# Patient Record
Sex: FEMALE | Race: WHITE | NOT HISPANIC OR LATINO | Employment: STUDENT | URBAN - METROPOLITAN AREA
[De-identification: names, ages, dates, MRNs, and addresses within clinical notes are randomized per-mention and may not be internally consistent; named-entity substitution may affect disease eponyms.]

---

## 2017-12-19 ENCOUNTER — TELEPHONE (OUTPATIENT)
Dept: GASTROENTEROLOGY | Facility: CLINIC | Age: 22
End: 2017-12-19

## 2017-12-19 NOTE — TELEPHONE ENCOUNTER
Left message for patient to return call to office to cancel appointment and refer her to [facility].

## 2018-06-08 ENCOUNTER — OFFICE VISIT (OUTPATIENT)
Dept: URGENT CARE | Facility: CLINIC | Age: 23
End: 2018-06-08
Payer: COMMERCIAL

## 2018-06-08 VITALS
OXYGEN SATURATION: 99 % | DIASTOLIC BLOOD PRESSURE: 70 MMHG | HEIGHT: 61 IN | BODY MASS INDEX: 22.09 KG/M2 | TEMPERATURE: 98 F | RESPIRATION RATE: 18 BRPM | HEART RATE: 77 BPM | WEIGHT: 117 LBS | SYSTOLIC BLOOD PRESSURE: 117 MMHG

## 2018-06-08 DIAGNOSIS — R10.9 ABDOMINAL PAIN, UNSPECIFIED ABDOMINAL LOCATION: Primary | ICD-10-CM

## 2018-06-08 DIAGNOSIS — R53.83 FATIGUE, UNSPECIFIED TYPE: ICD-10-CM

## 2018-06-08 DIAGNOSIS — B34.9 ACUTE VIRAL SYNDROME: ICD-10-CM

## 2018-06-08 DIAGNOSIS — N30.00 ACUTE CYSTITIS WITHOUT HEMATURIA: ICD-10-CM

## 2018-06-08 LAB
B-HCG UR QL: NEGATIVE
BILIRUB UR QL STRIP: NEGATIVE
CTP QC/QA: YES
CTP QC/QA: YES
GLUCOSE UR QL STRIP: NEGATIVE
HETEROPH AB SER QL: NEGATIVE
KETONES UR QL STRIP: NEGATIVE
LEUKOCYTE ESTERASE UR QL STRIP: POSITIVE
PH, POC UA: 6 (ref 5–8)
POC BLOOD, URINE: NEGATIVE
POC NITRATES, URINE: NEGATIVE
PROT UR QL STRIP: NEGATIVE
SP GR UR STRIP: 1.01 (ref 1–1.03)
UROBILINOGEN UR STRIP-ACNC: NORMAL (ref 0.1–1.1)

## 2018-06-08 PROCEDURE — 3008F BODY MASS INDEX DOCD: CPT | Mod: CPTII,S$GLB,, | Performed by: NURSE PRACTITIONER

## 2018-06-08 PROCEDURE — 81003 URINALYSIS AUTO W/O SCOPE: CPT | Mod: QW,S$GLB,, | Performed by: NURSE PRACTITIONER

## 2018-06-08 PROCEDURE — 99203 OFFICE O/P NEW LOW 30 MIN: CPT | Mod: 25,S$GLB,, | Performed by: NURSE PRACTITIONER

## 2018-06-08 PROCEDURE — 86308 HETEROPHILE ANTIBODY SCREEN: CPT | Mod: QW,S$GLB,, | Performed by: NURSE PRACTITIONER

## 2018-06-08 PROCEDURE — 81025 URINE PREGNANCY TEST: CPT | Mod: S$GLB,,, | Performed by: NURSE PRACTITIONER

## 2018-06-08 RX ORDER — ONDANSETRON 4 MG/1
4 TABLET, ORALLY DISINTEGRATING ORAL EVERY 6 HOURS PRN
Qty: 16 TABLET | Refills: 0 | Status: SHIPPED | OUTPATIENT
Start: 2018-06-08 | End: 2023-08-15

## 2018-06-08 RX ORDER — LEVONORGESTREL AND ETHINYL ESTRADIOL 0.1-0.02MG
1 KIT ORAL DAILY
COMMUNITY

## 2018-06-08 RX ORDER — NITROFURANTOIN 25; 75 MG/1; MG/1
100 CAPSULE ORAL 2 TIMES DAILY
Qty: 10 CAPSULE | Refills: 0 | Status: SHIPPED | OUTPATIENT
Start: 2018-06-08 | End: 2018-06-13

## 2018-06-08 RX ORDER — FLUTICASONE PROPIONATE 50 MCG
1 SPRAY, SUSPENSION (ML) NASAL DAILY
COMMUNITY
End: 2023-08-15

## 2018-06-08 NOTE — PROGRESS NOTES
"Subjective:       Patient ID: Jennifer Velarde is a 22 y.o. female.    Vitals:  height is 5' 1" (1.549 m) and weight is 53.1 kg (117 lb). Her oral temperature is 97.8 °F (36.6 °C). Her blood pressure is 117/70 and her pulse is 77. Her respiration is 18 and oxygen saturation is 99%.     Chief Complaint: Abdominal Pain    Patient presents with complaint of fatigue, weakness and nausea in the last 3 days. Patient complaining of a temperature of 99.8 F. Patient denies diarrhea. LBM was this morning. No blood in stool. No vomiting. Abdominal pain subsided this morning but present for 2 hours today. Patient has had abdominal issues in the last month. Patient reports the issues as abdominal tenderness and constipation. Patient saw her NP at health services and it was suggested that she do a fleet enema. Patient did an enema at home followed by miralax at home for 7 days. Patient communicates she had relief with remedy. Patient also saw her obgyn to r/o ovarian cysts which were negative. Patient does not suspect any chance for pregnancy. She did take two at home pregnancy tests last month; both were negative. Patient without any urinary sxs: no dysuria, hematuria, urgency or frequency.       Abdominal Pain   The onset quality is undetermined. The problem occurs intermittently. The problem has been gradually improving. The pain is located in the LLQ. The pain is at a severity of 5/10 (when present). Quality: stabbing. The abdominal pain radiates to the epigastric region. Associated symptoms include constipation and nausea. Pertinent negatives include no diarrhea, dysuria, fever, hematochezia, melena or vomiting.     Review of Systems   Constitution: Positive for weakness and malaise/fatigue. Negative for chills, fever and night sweats.   Cardiovascular: Negative for chest pain.   Respiratory: Negative for shortness of breath.    Musculoskeletal: Negative for back pain.   Gastrointestinal: Positive for abdominal pain, " constipation and nausea. Negative for diarrhea, heartburn, hematemesis, hematochezia, hemorrhoids, melena and vomiting.   Genitourinary: Negative for dysuria.       Objective:      Physical Exam   Constitutional: She is oriented to person, place, and time. She appears well-developed and well-nourished.  Non-toxic appearance. She does not have a sickly appearance. She does not appear ill. No distress.   HENT:   Head: Normocephalic and atraumatic.   Right Ear: External ear normal.   Left Ear: External ear normal.   Nose: Nose normal.   Mouth/Throat: Mucous membranes are normal. Posterior oropharyngeal erythema (mild) present. No oropharyngeal exudate or posterior oropharyngeal edema.   Eyes: Conjunctivae and lids are normal.   Allergic shiners bilaterally    Neck: Trachea normal, normal range of motion, full passive range of motion without pain and phonation normal. Neck supple.   Cardiovascular: Normal rate, regular rhythm, normal heart sounds and normal pulses.    Pulmonary/Chest: Effort normal and breath sounds normal. No respiratory distress.   Abdominal: Soft. Normal appearance and bowel sounds are normal. She exhibits no distension, no fluid wave, no abdominal bruit, no ascites, no pulsatile midline mass and no mass. There is no tenderness. There is no rigidity, no rebound, no guarding, no CVA tenderness, no tenderness at McBurney's point and negative Garcia's sign.   Musculoskeletal: Normal range of motion. She exhibits no edema.   Lymphadenopathy:     She has cervical adenopathy.        Right cervical: Superficial cervical and posterior cervical adenopathy present.   Neurological: She is alert and oriented to person, place, and time. She has normal strength.   Skin: Skin is warm, dry and intact. She is not diaphoretic. No pallor.   Psychiatric: She has a normal mood and affect. Her speech is normal and behavior is normal. Judgment and thought content normal. Cognition and memory are normal.   Nursing note and  vitals reviewed.        Results for orders placed or performed in visit on 06/08/18   POCT urine pregnancy   Result Value Ref Range    POC Preg Test, Ur Negative Negative     Acceptable Yes    POCT Urinalysis, Dipstick, Automated, W/O Scope   Result Value Ref Range    POC Blood, Urine Negative Negative    POC Bilirubin, Urine Negative Negative    POC Urobilinogen, Urine Normal 0.1 - 1.1    POC Ketones, Urine Negative Negative    POC Protein, Urine Negative Negative    POC Nitrates, Urine Negative Negative    POC Glucose, Urine Negative Negative    pH, UA 6.0 5 - 8    POC Specific Gravity, Urine 1.015 1.003 - 1.029    POC Leukocytes, Urine Positive (A) Negative   POCT Infectious mononucleosis antibody   Result Value Ref Range    Monospot Negative Negative     Acceptable Yes        Assessment:       1. Abdominal pain, unspecified abdominal location    2. Fatigue, unspecified type    3. Acute viral syndrome    4. Acute cystitis without hematuria        Plan:         Abdominal pain, unspecified abdominal location  -     POCT urine pregnancy  -     POCT Urinalysis, Dipstick, Automated, W/O Scope  -     Urine culture    Fatigue, unspecified type  -     POCT Infectious mononucleosis antibody    Acute viral syndrome  -     ondansetron (ZOFRAN-ODT) 4 MG TbDL; Take 1 tablet (4 mg total) by mouth every 6 (six) hours as needed.  Dispense: 16 tablet; Refill: 0    Acute cystitis without hematuria  -     nitrofurantoin, macrocrystal-monohydrate, (MACROBID) 100 MG capsule; Take 1 capsule (100 mg total) by mouth 2 (two) times daily.  Dispense: 10 capsule; Refill: 0      Trace leukocytes in urine. She would like to wait for culture results before starting antibiotics.   Discussed merlene s.s for reevaluation and for ER.   Encouraged fluids and bland diet. She verbalized understanding.    ADDENDUM 1735: patient called back to the clinic and reports she has changed her mind and would like to start an  antibiotic for the trace leukocytes instead of waiting for the urine culture results. Discussed that this is likely a contaminated specimen. She verbalized understanding and would still like to start antibiotics.   RX for Macrobid for 5 days.       Patient Instructions     Urine culture sent today. We will call you with the results.   Rest  [place] diet   Force fluids  zofran as needed for nausea.   Follow up with PCP for persist GI issues.     ABDOMINAL PAIN          Based on your visit today, the exact cause of your abdominal (stomach) pain is not certain. Your condition does not seem serious now; however, the signs of a serious problem may take more time to appear. Therefore, it is important for you to watch for any new symptoms or worsening of your condition as we discussed in the clinic, including appendicitis, kidney stones, ruptured ovarian cysts, diverticulitis.      HOME CARE:  1. Rest until your next exam. No strenuous activities.  2. Eat a diet low in fiber (called a low-residue diet). Foods allowed include refined breads, white rice, fruit and vegetable juices without pulp, tender meats. These foods will pass more easily through the intestine.  3. Avoid whole-grain foods, whole fruits and vegetables, meats, seeds and nuts, fried or fatty foods, dairy, alcohol and spicy foods until your symptoms go away.     FOLLOW UP with your doctor or this facility as instructed, or if your pain does not begin to improve in the next 24 hours.     GET PROMPT MEDICAL ATTENTION if any of the following occur:  Pain gets worse or moves to the right lower abdomen  New or worsening vomiting or diarrhea  Swelling of the abdomen  Unable to pass stool for more than three days  New fever over 100.0º F (37.8ºC), or rising fever  Blood in vomit or bowel movements (dark red or black color)  Jaundice (yellow color of eyes and skin)  Weakness, dizziness or fainting  Chest, arm, back, neck or jaw pain  Unexpected vaginal bleeding or  "missed period        Viral Syndrome (Adult)  A viral illness may cause a number of symptoms. The symptoms depend on the part of the body that the virus affects. If it settles in your nose, throat, and lungs, it may cause cough, sore throat, congestion, and sometimes headache. If it settles in your stomach and intestinal tract, it may cause vomiting and diarrhea. Sometimes it causes vague symptoms like "aching all over," feeling tired, loss of appetite, or fever.  A viral illness usually lasts 1 to 2 weeks, but sometimes it lasts longer. In some cases, a more serious infection can look like a viral syndrome in the first few days of the illness. You may need another exam and additional tests to know the difference. Watch for the warning signs listed below.  Home care  Follow these guidelines for taking care of yourself at home:  · If symptoms are severe, rest at home for the first 2 to 3 days.  · Stay away from cigarette smoke - both your smoke and the smoke from others.  · You may use over-the-counter acetaminophen or ibuprofen for fever, muscle aching, and headache, unless another medicine was prescribed for this. If you have chronic liver or kidney disease or ever had a stomach ulcer or GI bleeding, talk with your doctor before using these medicines. No one who is younger than 18 and ill with a fever should take aspirin. It may cause severe disease or death.  · Your appetite may be poor, so a light diet is fine. Avoid dehydration by drinking 8 to 12 8-ounce glasses of fluids each day. This may include water; orange juice; lemonade; apple, grape, and cranberry juice; clear fruit drinks; electrolyte replacement and sports drinks; and decaffeinated teas and coffee. If you have been diagnosed with a kidney disease, ask your doctor how much and what types of fluids you should drink to prevent dehydration. If you have kidney disease, drinking too much fluid can cause it build up in the your body and be dangerous to your " health.  · Over-the-counter remedies won't shorten the length of the illness but may be helpful for cough, sore throat; and nasal and sinus congestion. Don't use decongestants if you have high blood pressure.  Follow-up care  Follow up with your healthcare provider if you do not improve over the next week.  Call 911  Get emergency medical care if any of the following occur:  · Convulsion  · Feeling weak, dizzy, or like you are going to faint  · Chest pain, shortness of breath, wheezing, or difficulty breathing  When to seek medical advice  Call your healthcare provider right away if any of these occur:  · Cough with lots of colored sputum (mucus) or blood in your sputum  · Chest pain, shortness of breath, wheezing, or difficulty breathing  · Severe headache; face, neck, or ear pain  · Severe, constant pain in the lower right side of your belly (abdominal)  · Continued vomiting (cant keep liquids down)  · Frequent diarrhea (more than 5 times a day); blood (red or black color) or mucus in diarrhea  · Feeling weak, dizzy, or like you are going to faint  · Extreme thirst  · Fever of 100.4°F (38°C) or higher, or as directed by your healthcare provider  Date Last Reviewed: 9/25/2015  © 6877-4443 [employer]. [address] [place] [address], [place] [address]. All rights reserved. This information is not intended as a substitute for professional medical care. Always follow your healthcare professional's instructions.        Abdominal Pain    Abdominal pain is pain in the stomach or belly area. Everyone has this pain from time to time. In many cases it goes away on its own. But abdominal pain can sometimes be due to a serious problem, such as appendicitis. So its important to know when to seek help.  Causes of abdominal pain  There are many possible causes of abdominal pain. Common causes in adults include:  · Constipation, diarrhea, or gas  · Stomach acid flowing back up into the esophagus (acid reflux or  heartburn)  · Severe acid reflux, called GERD (gastroesophageal reflux disease)  · A sore in the lining of the stomach or small intestine (peptic ulcer)  · Inflammation of the gallbladder, liver, or pancreas  · Gallstones or kidney stones  · Appendicitis   · Intestinal blockage   · An internal organ pushing through a muscle or other tissue (hernia)  · Urinary tract infections  · In women, menstrual cramps, fibroids, or endometriosis  · Inflammation or infection of the intestines  Diagnosing the cause of abdominal pain  Your healthcare provider will do a physical exam help find the cause of your pain. If needed, tests will be ordered. Belly pain has many possible causes. So it can be hard to find the reason for your pain. Giving details about your pain can help. Tell your provider where and when you feel the pain, and what makes it better or worse. Also let your provider know if you have other symptoms such as:  · Fever  · Tiredness  · Upset stomach (nausea)  · Vomiting  · Changes in bathroom habits  Treating abdominal pain  Some causes of pain need emergency medical treatment right away. These include appendicitis or a bowel blockage. Other problems can be treated with rest, fluids, or medicines. Your healthcare provider can give you specific instructions for treatment or self-care based on what is causing your pain.  If you have vomiting or diarrhea, sip water or other clear fluids. When you are ready to eat solid foods again, start with small amounts of easy-to-digest, low-fat foods. These include apple sauce, toast, or crackers.   When to seek medical care  Call 911 or go to the hospital right away if you:  · Cant pass stool and are vomiting  · Are vomiting blood or have bloody diarrhea or black, tarry diarrhea  · Have chest, neck, or shoulder pain  · Feel like you might pass out  · Have pain in your shoulder blades with nausea  · Have sudden, severe belly pain  · Have new, severe pain unlike any you have felt  before  · Have a belly that is rigid, hard, and tender to touch  Call your healthcare provider if you have:  · Pain for more than 5 days  · Bloating for more than 2 days  · Diarrhea for more than 5 days  · A fever of 100.4°F (38.0°C) or higher, or as directed by your provider  · Pain that gets worse  · Weight loss for no reason  · Continued lack of appetite  · Blood in your stool  How to prevent abdominal pain  Here are some tips to help prevent abdominal pain:  · Eat smaller amounts of food at one time.  · Avoid greasy, fried, or other high-fat foods.  · Avoid foods that give you gas.  · Exercise regularly.  · Drink plenty of fluids.  To help prevent GERD symptoms:  · Quit smoking.  · Reduce alcohol and certain foods that increase stomach acid.  · Avoid aspirin and over-the-counter pain and fever medicines (NSAIDS or nonsteroidal anti-inflammatory drugs), if possible  · Lose extra weight.  · Finish eating at least 2 hours before you go to bed or lie down.  · Raise the head of your bed.  Date Last Reviewed: 7/1/2016  © 0915-1933 [employer]. [address] [place] [address]. All rights reserved. This information is not intended as a substitute for professional medical care. Always follow your healthcare professional's instructions.

## 2018-06-08 NOTE — PATIENT INSTRUCTIONS
Urine culture sent today. We will call you with the results.   Rest  [place] diet   Force fluids  zofran as needed for nausea.   Follow up with PCP for persist GI issues.     ABDOMINAL PAIN          Based on your visit today, the exact cause of your abdominal (stomach) pain is not certain. Your condition does not seem serious now; however, the signs of a serious problem may take more time to appear. Therefore, it is important for you to watch for any new symptoms or worsening of your condition as we discussed in the clinic, including appendicitis, kidney stones, ruptured ovarian cysts, diverticulitis.      HOME CARE:  1. Rest until your next exam. No strenuous activities.  2. Eat a diet low in fiber (called a low-residue diet). Foods allowed include refined breads, white rice, fruit and vegetable juices without pulp, tender meats. These foods will pass more easily through the intestine.  3. Avoid whole-grain foods, whole fruits and vegetables, meats, seeds and nuts, fried or fatty foods, dairy, alcohol and spicy foods until your symptoms go away.     FOLLOW UP with your doctor or this facility as instructed, or if your pain does not begin to improve in the next 24 hours.     GET PROMPT MEDICAL ATTENTION if any of the following occur:  Pain gets worse or moves to the right lower abdomen  New or worsening vomiting or diarrhea  Swelling of the abdomen  Unable to pass stool for more than three days  New fever over 100.0º F (37.8ºC), or rising fever  Blood in vomit or bowel movements (dark red or black color)  Jaundice (yellow color of eyes and skin)  Weakness, dizziness or fainting  Chest, arm, back, neck or jaw pain  Unexpected vaginal bleeding or missed period        Viral Syndrome (Adult)  A viral illness may cause a number of symptoms. The symptoms depend on the part of the body that the virus affects. If it settles in your nose, throat, and lungs, it may cause cough, sore throat, congestion, and sometimes headache.  "If it settles in your stomach and intestinal tract, it may cause vomiting and diarrhea. Sometimes it causes vague symptoms like "aching all over," feeling tired, loss of appetite, or fever.  A viral illness usually lasts 1 to 2 weeks, but sometimes it lasts longer. In some cases, a more serious infection can look like a viral syndrome in the first few days of the illness. You may need another exam and additional tests to know the difference. Watch for the warning signs listed below.  Home care  Follow these guidelines for taking care of yourself at home:  · If symptoms are severe, rest at home for the first 2 to 3 days.  · Stay away from cigarette smoke - both your smoke and the smoke from others.  · You may use over-the-counter acetaminophen or ibuprofen for fever, muscle aching, and headache, unless another medicine was prescribed for this. If you have chronic liver or kidney disease or ever had a stomach ulcer or GI bleeding, talk with your doctor before using these medicines. No one who is younger than 18 and ill with a fever should take aspirin. It may cause severe disease or death.  · Your appetite may be poor, so a light diet is fine. Avoid dehydration by drinking 8 to 12 8-ounce glasses of fluids each day. This may include water; orange juice; lemonade; apple, grape, and cranberry juice; clear fruit drinks; electrolyte replacement and sports drinks; and decaffeinated teas and coffee. If you have been diagnosed with a kidney disease, ask your doctor how much and what types of fluids you should drink to prevent dehydration. If you have kidney disease, drinking too much fluid can cause it build up in the your body and be dangerous to your health.  · Over-the-counter remedies won't shorten the length of the illness but may be helpful for cough, sore throat; and nasal and sinus congestion. Don't use decongestants if you have high blood pressure.  Follow-up care  Follow up with your healthcare provider if you do " not improve over the next week.  Call 911  Get emergency medical care if any of the following occur:  · Convulsion  · Feeling weak, dizzy, or like you are going to faint  · Chest pain, shortness of breath, wheezing, or difficulty breathing  When to seek medical advice  Call your healthcare provider right away if any of these occur:  · Cough with lots of colored sputum (mucus) or blood in your sputum  · Chest pain, shortness of breath, wheezing, or difficulty breathing  · Severe headache; face, neck, or ear pain  · Severe, constant pain in the lower right side of your belly (abdominal)  · Continued vomiting (cant keep liquids down)  · Frequent diarrhea (more than 5 times a day); blood (red or black color) or mucus in diarrhea  · Feeling weak, dizzy, or like you are going to faint  · Extreme thirst  · Fever of 100.4°F (38°C) or higher, or as directed by your healthcare provider  Date Last Reviewed: 9/25/2015  © 0498-8354 [employer]. [address] [place] [address]. All rights reserved. This information is not intended as a substitute for professional medical care. Always follow your healthcare professional's instructions.        Abdominal Pain    Abdominal pain is pain in the stomach or belly area. Everyone has this pain from time to time. In many cases it goes away on its own. But abdominal pain can sometimes be due to a serious problem, such as appendicitis. So its important to know when to seek help.  Causes of abdominal pain  There are many possible causes of abdominal pain. Common causes in adults include:  · Constipation, diarrhea, or gas  · Stomach acid flowing back up into the esophagus (acid reflux or heartburn)  · Severe acid reflux, called GERD (gastroesophageal reflux disease)  · A sore in the lining of the stomach or small intestine (peptic ulcer)  · Inflammation of the gallbladder, liver, or pancreas  · Gallstones or kidney stones  · Appendicitis   · Intestinal blockage   · An  internal organ pushing through a muscle or other tissue (hernia)  · Urinary tract infections  · In women, menstrual cramps, fibroids, or endometriosis  · Inflammation or infection of the intestines  Diagnosing the cause of abdominal pain  Your healthcare provider will do a physical exam help find the cause of your pain. If needed, tests will be ordered. Belly pain has many possible causes. So it can be hard to find the reason for your pain. Giving details about your pain can help. Tell your provider where and when you feel the pain, and what makes it better or worse. Also let your provider know if you have other symptoms such as:  · Fever  · Tiredness  · Upset stomach (nausea)  · Vomiting  · Changes in bathroom habits  Treating abdominal pain  Some causes of pain need emergency medical treatment right away. These include appendicitis or a bowel blockage. Other problems can be treated with rest, fluids, or medicines. Your healthcare provider can give you specific instructions for treatment or self-care based on what is causing your pain.  If you have vomiting or diarrhea, sip water or other clear fluids. When you are ready to eat solid foods again, start with small amounts of easy-to-digest, low-fat foods. These include apple sauce, toast, or crackers.   When to seek medical care  Call 911 or go to the hospital right away if you:  · Cant pass stool and are vomiting  · Are vomiting blood or have bloody diarrhea or black, tarry diarrhea  · Have chest, neck, or shoulder pain  · Feel like you might pass out  · Have pain in your shoulder blades with nausea  · Have sudden, severe belly pain  · Have new, severe pain unlike any you have felt before  · Have a belly that is rigid, hard, and tender to touch  Call your healthcare provider if you have:  · Pain for more than 5 days  · Bloating for more than 2 days  · Diarrhea for more than 5 days  · A fever of 100.4°F (38.0°C) or higher, or as directed by your provider  · Pain  that gets worse  · Weight loss for no reason  · Continued lack of appetite  · Blood in your stool  How to prevent abdominal pain  Here are some tips to help prevent abdominal pain:  · Eat smaller amounts of food at one time.  · Avoid greasy, fried, or other high-fat foods.  · Avoid foods that give you gas.  · Exercise regularly.  · Drink plenty of fluids.  To help prevent GERD symptoms:  · Quit smoking.  · Reduce alcohol and certain foods that increase stomach acid.  · Avoid aspirin and over-the-counter pain and fever medicines (NSAIDS or nonsteroidal anti-inflammatory drugs), if possible  · Lose extra weight.  · Finish eating at least 2 hours before you go to bed or lie down.  · Raise the head of your bed.  Date Last Reviewed: 7/1/2016  © 5551-9786 [employer]. [address] [place] [address], [place] [address]. All rights reserved. This information is not intended as a substitute for professional medical care. Always follow your healthcare professional's instructions.

## 2018-06-11 ENCOUNTER — TELEPHONE (OUTPATIENT)
Dept: URGENT CARE | Facility: CLINIC | Age: 23
End: 2018-06-11

## 2018-06-11 NOTE — TELEPHONE ENCOUNTER
Patient returned call. She has improved since her visit. Patient wanting to know the result of her urine cx. Informed patient that cx is still pending. Patient without further questions at time of call. -[facility]

## 2018-06-12 ENCOUNTER — TELEPHONE (OUTPATIENT)
Dept: URGENT CARE | Facility: CLINIC | Age: 23
End: 2018-06-12

## 2018-06-12 LAB
BACTERIA UR CULT: NORMAL
BACTERIA UR CULT: NORMAL

## 2019-04-02 ENCOUNTER — OFFICE VISIT (OUTPATIENT)
Dept: OBSTETRICS AND GYNECOLOGY | Facility: CLINIC | Age: 24
End: 2019-04-02
Payer: COMMERCIAL

## 2019-04-02 ENCOUNTER — TELEPHONE (OUTPATIENT)
Dept: PHARMACY | Facility: CLINIC | Age: 24
End: 2019-04-02

## 2019-04-02 VITALS
SYSTOLIC BLOOD PRESSURE: 110 MMHG | BODY MASS INDEX: 21.39 KG/M2 | DIASTOLIC BLOOD PRESSURE: 76 MMHG | WEIGHT: 113.31 LBS | HEIGHT: 61 IN

## 2019-04-02 DIAGNOSIS — Z01.419 ENCOUNTER FOR GYNECOLOGICAL EXAMINATION WITHOUT ABNORMAL FINDING: Primary | ICD-10-CM

## 2019-04-02 DIAGNOSIS — Z30.9 ENCOUNTER FOR CONTRACEPTIVE MANAGEMENT, UNSPECIFIED TYPE: ICD-10-CM

## 2019-04-02 PROCEDURE — 88142 CYTOPATH C/V THIN LAYER: CPT | Performed by: PATHOLOGY

## 2019-04-02 PROCEDURE — 88141 CYTOPATH C/V INTERPRET: CPT | Mod: ,,, | Performed by: PATHOLOGY

## 2019-04-02 PROCEDURE — 99999 PR PBB SHADOW E&M-EST. PATIENT-LVL III: ICD-10-PCS | Mod: PBBFAC,,, | Performed by: OBSTETRICS & GYNECOLOGY

## 2019-04-02 PROCEDURE — 99999 PR PBB SHADOW E&M-EST. PATIENT-LVL III: CPT | Mod: PBBFAC,,, | Performed by: OBSTETRICS & GYNECOLOGY

## 2019-04-02 PROCEDURE — 99385 PREV VISIT NEW AGE 18-39: CPT | Mod: S$GLB,,, | Performed by: OBSTETRICS & GYNECOLOGY

## 2019-04-02 PROCEDURE — 99385 PR PREVENTIVE VISIT,NEW,18-39: ICD-10-PCS | Mod: S$GLB,,, | Performed by: OBSTETRICS & GYNECOLOGY

## 2019-04-02 PROCEDURE — 88141 LIQUID-BASED PAP SMEAR, SCREENING: ICD-10-PCS | Mod: ,,, | Performed by: PATHOLOGY

## 2019-04-02 NOTE — PROGRESS NOTES
"CC: Well woman exam    Jennifer Velarde is a 23 y.o. female No obstetric history on file. presents for a well woman exam.  She has no issues, problems, or complaints.  She is on OCPs and would like to continue.  May consider a MIRENA IUD.   Declines STD testing    Past Medical History:   Diagnosis Date    Allergy        History reviewed. No pertinent surgical history.    OB History   No data available       Family History   Problem Relation Age of Onset    Breast cancer Neg Hx     Colon cancer Neg Hx     Ovarian cancer Neg Hx        Social History     Tobacco Use    Smoking status: Never Smoker    Smokeless tobacco: Never Used   Substance Use Topics    Alcohol use: Yes    Drug use: No       /76   Ht 5' 1" (1.549 m)   Wt 51.4 kg (113 lb 5.1 oz)   LMP 03/04/2019   BMI 21.41 kg/m²     ROS:  GENERAL: Denies weight gain or weight loss. Feeling well overall.   SKIN: Denies rash or lesions.   HEAD: Denies head injury or headache.   NODES: Denies enlarged lymph nodes.   CHEST: Denies chest pain or shortness of breath.   CARDIOVASCULAR: Denies palpitations or left sided chest pain.   ABDOMEN: No abdominal pain, constipation, diarrhea, nausea, vomiting or rectal bleeding.   URINARY: No frequency, dysuria, hematuria, or burning on urination.  REPRODUCTIVE: See HPI.   BREASTS: The patient performs breast self-examination and denies pain, lumps, or nipple discharge.   HEMATOLOGIC: No easy bruisability or excessive bleeding.  MUSCULOSKELETAL: Denies joint pain or swelling.   NEUROLOGIC: Denies syncope or weakness.   PSYCHIATRIC: Denies depression, anxiety or mood swings.    Physical Exam:    APPEARANCE: Well nourished, well developed, in no acute distress.  AFFECT: WNL, alert and oriented x 3  SKIN: No acne or hirsutism  NECK: Neck symmetric without masses or thyromegaly  NODES: No inguinal, cervical, axillary, or femoral lymph node enlargement  CHEST: Good respiratory effect  ABDOMEN: Soft.  No tenderness or " masses.  No hepatosplenomegaly.  No hernias.  BREASTS: Symmetrical, no skin changes or visible lesions.  No palpable masses, nipple discharge bilaterally.  PELVIC: Normal external genitalia without lesions.  Normal hair distribution.  Adequate perineal body, normal urethral meatus.  Vagina moist and well rugated without lesions or discharge.  Cervix pink, without lesions, discharge or tenderness.  No significant cystocele or rectocele.  Bimanual exam shows uterus to be normal size, regular, mobile and nontender.  Adnexa without masses or tenderness.    EXTREMITIES: No edema.    ASSESSMENT AND PLAN  1. Encounter for gynecological examination without abnormal finding  Liquid-based pap smear, screening   2. Encounter for contraceptive management, unspecified type  levonorgestrel (MIRENA) 20 mcg/24 hr (5 years) IUD    Device Authorization Order       Patient was counseled today on A.C.S. Pap guidelines and recommendations for yearly pelvic exams, mammograms and monthly self breast exams; to see her PCP for other health maintenance.     Follow up in about 1 year (around 4/2/2020).

## 2019-04-10 ENCOUNTER — TELEPHONE (OUTPATIENT)
Dept: OBSTETRICS AND GYNECOLOGY | Facility: CLINIC | Age: 24
End: 2019-04-10

## 2019-04-11 NOTE — TELEPHONE ENCOUNTER
Patient advised of Mirena approval. Patient given contact information to inquire about copay. Patient verbalized understanding.

## 2019-04-25 NOTE — TELEPHONE ENCOUNTER
Patient returned call & declined pharmacist consultation & provided consent to schedule shipment to [facility]. She has an appointment for insertion on 5/6/19. Ok to confirm delivery with [facility]

## 2019-05-07 ENCOUNTER — PROCEDURE VISIT (OUTPATIENT)
Dept: OBSTETRICS AND GYNECOLOGY | Facility: CLINIC | Age: 24
End: 2019-05-07
Payer: COMMERCIAL

## 2019-05-07 VITALS
BODY MASS INDEX: 22.48 KG/M2 | WEIGHT: 119.06 LBS | DIASTOLIC BLOOD PRESSURE: 68 MMHG | SYSTOLIC BLOOD PRESSURE: 92 MMHG | HEIGHT: 61 IN

## 2019-05-07 DIAGNOSIS — Z30.9 ENCOUNTER FOR CONTRACEPTIVE MANAGEMENT, UNSPECIFIED TYPE: Primary | ICD-10-CM

## 2019-05-07 DIAGNOSIS — Z30.430 ENCOUNTER FOR IUD INSERTION: ICD-10-CM

## 2019-05-07 DIAGNOSIS — R87.615 ENCOUNTER FOR REPEAT PAP SMEAR DUE TO PREVIOUS INSUFF CERVICAL CELLS: ICD-10-CM

## 2019-05-07 LAB
B-HCG UR QL: NEGATIVE
CTP QC/QA: YES

## 2019-05-07 PROCEDURE — 81025 URINE PREGNANCY TEST: CPT | Mod: S$GLB,,, | Performed by: OBSTETRICS & GYNECOLOGY

## 2019-05-07 PROCEDURE — 58300 INSERTION OF IUD: ICD-10-PCS | Mod: S$GLB,,, | Performed by: OBSTETRICS & GYNECOLOGY

## 2019-05-07 PROCEDURE — 88175 CYTOPATH C/V AUTO FLUID REDO: CPT

## 2019-05-07 PROCEDURE — 81025 POCT URINE PREGNANCY: ICD-10-PCS | Mod: S$GLB,,, | Performed by: OBSTETRICS & GYNECOLOGY

## 2019-05-07 PROCEDURE — 58300 INSERT INTRAUTERINE DEVICE: CPT | Mod: S$GLB,,, | Performed by: OBSTETRICS & GYNECOLOGY

## 2019-05-07 RX ORDER — AZELASTINE HYDROCHLORIDE, FLUTICASONE PROPIONATE 137; 50 UG/1; UG/1
SPRAY, METERED NASAL
COMMUNITY

## 2019-05-07 NOTE — PROCEDURES
Insertion of IUD  Date/Time: 5/7/2019 9:11 AM  Performed by: Elenita Landon MD  Authorized by: Elenita Landon MD     Consent:     Consent obtained:  Written    Consent given by:  Patient    Procedure risks and benefits discussed: yes      Patient questions answered: yes      Patient agrees, verbalizes understanding, and wants to proceed: yes      Educational handouts given: yes      Instructions and paperwork completed: yes    Procedure:     Pelvic exam performed: yes      Negative GC/chlamydia test: no      Negative urine pregnancy test: yes      Negative serum pregnancy test: no      Cervix cleaned and prepped: yes      Speculum placed in vagina: yes      Tenaculum applied to cervix: yes      Uterus sounded: yes      Uterus sound depth (cm):  7    IUD inserted with no complications: yes      Strings trimmed: yes    Post-procedure:     Patient tolerated procedure well: yes      Patient will follow up after next period: yes

## 2019-06-11 ENCOUNTER — OFFICE VISIT (OUTPATIENT)
Dept: OBSTETRICS AND GYNECOLOGY | Facility: CLINIC | Age: 24
End: 2019-06-11
Payer: COMMERCIAL

## 2019-06-11 VITALS
HEIGHT: 61 IN | WEIGHT: 116.94 LBS | BODY MASS INDEX: 22.08 KG/M2 | DIASTOLIC BLOOD PRESSURE: 64 MMHG | SYSTOLIC BLOOD PRESSURE: 105 MMHG

## 2019-06-11 DIAGNOSIS — Z30.431 IUD CHECK UP: Primary | ICD-10-CM

## 2019-06-11 PROCEDURE — 99213 OFFICE O/P EST LOW 20 MIN: CPT | Mod: S$GLB,,, | Performed by: OBSTETRICS & GYNECOLOGY

## 2019-06-11 PROCEDURE — 99999 PR PBB SHADOW E&M-EST. PATIENT-LVL III: ICD-10-PCS | Mod: PBBFAC,,, | Performed by: OBSTETRICS & GYNECOLOGY

## 2019-06-11 PROCEDURE — 99213 PR OFFICE/OUTPT VISIT, EST, LEVL III, 20-29 MIN: ICD-10-PCS | Mod: S$GLB,,, | Performed by: OBSTETRICS & GYNECOLOGY

## 2019-06-11 PROCEDURE — 99999 PR PBB SHADOW E&M-EST. PATIENT-LVL III: CPT | Mod: PBBFAC,,, | Performed by: OBSTETRICS & GYNECOLOGY

## 2019-06-11 NOTE — PROGRESS NOTES
"CC: IUD string check    Jennifer Velarde is a 23 y.o. female No obstetric history on file. presents for an IUD check up.   String are normal and palpated.  MIRENA PLACED ON 5/7/2019.  Had vaginal spotting but has improved.    Past Medical History:   Diagnosis Date    Allergy        History reviewed. No pertinent surgical history.    OB History   No data available       Family History   Problem Relation Age of Onset    Breast cancer Neg Hx     Colon cancer Neg Hx     Ovarian cancer Neg Hx        Social History     Tobacco Use    Smoking status: Never Smoker    Smokeless tobacco: Never Used   Substance Use Topics    Alcohol use: Yes    Drug use: No       /64   Ht 5' 1" (1.549 m)   Wt 53 kg (116 lb 15.3 oz)   LMP 06/03/2019   BMI 22.10 kg/m²     ROS:  GENERAL: Denies weight gain or weight loss. Feeling well overall.   SKIN: Denies rash or lesions.   HEAD: Denies head injury or headache.   NODES: Denies enlarged lymph nodes.   CHEST: Denies chest pain or shortness of breath.   CARDIOVASCULAR: Denies palpitations or left sided chest pain.   ABDOMEN: No abdominal pain, constipation, diarrhea, nausea, vomiting or rectal bleeding.   URINARY: No frequency, dysuria, hematuria, or burning on urination.  REPRODUCTIVE: See HPI.   BREASTS: The patient performs breast self-examination and denies pain, lumps, or nipple discharge.   HEMATOLOGIC: No easy bruisability or excessive bleeding.  MUSCULOSKELETAL: Denies joint pain or swelling.   NEUROLOGIC: Denies syncope or weakness.   PSYCHIATRIC: Denies depression, anxiety or mood swings.    Physical Exam:    APPEARANCE: Well nourished, well developed, in no acute distress.  AFFECT: WNL, alert and oriented x 3  SKIN: No acne or hirsutism  NECK: Neck symmetric without masses or thyromegaly  NODES: No inguinal, cervical, axillary, or femoral lymph node enlargement  CHEST: Good respiratory effect  ABDOMEN: Soft.  No tenderness or masses.  No hepatosplenomegaly.  No " hernias.  PELVIC: Normal external genitalia without lesions.  Normal hair distribution.  Adequate perineal body, normal urethral meatus.  Vagina moist and well rugated without lesions or discharge.  Cervix pink, IUD string at the os,  without lesions, discharge or tenderness.  No significant cystocele or rectocele.  Bimanual exam shows uterus to be normal size, regular, mobile and nontender.  Adnexa without masses or tenderness.    EXTREMITIES: No edema.    ASSESSMENT AND PLAN  1. IUD check up         Patient was counseled today on A.C.S. Pap guidelines and recommendations for yearly pelvic exams, mammograms and monthly self breast exams; to see her PCP for other health maintenance.     Follow up in about 1 year (around 6/11/2020), or if symptoms worsen or fail to improve.

## 2022-01-01 ENCOUNTER — OFFICE VISIT (OUTPATIENT)
Dept: URGENT CARE | Facility: CLINIC | Age: 27
End: 2022-01-01

## 2022-01-01 VITALS
BODY MASS INDEX: 22.66 KG/M2 | TEMPERATURE: 98 F | HEIGHT: 61 IN | OXYGEN SATURATION: 98 % | HEART RATE: 62 BPM | SYSTOLIC BLOOD PRESSURE: 114 MMHG | DIASTOLIC BLOOD PRESSURE: 76 MMHG | RESPIRATION RATE: 18 BRPM | WEIGHT: 120 LBS

## 2022-01-01 DIAGNOSIS — U07.1 COVID-19: Primary | ICD-10-CM

## 2022-01-01 LAB
CTP QC/QA: YES
SARS-COV-2 RDRP RESP QL NAA+PROBE: POSITIVE

## 2022-01-01 PROCEDURE — 99203 PR OFFICE/OUTPT VISIT, NEW, LEVL III, 30-44 MIN: ICD-10-PCS | Mod: TIER,S$GLB,, | Performed by: NURSE PRACTITIONER

## 2022-01-01 PROCEDURE — U0002 COVID-19 LAB TEST NON-CDC: HCPCS | Mod: QW,S$GLB,, | Performed by: NURSE PRACTITIONER

## 2022-01-01 PROCEDURE — 99203 OFFICE O/P NEW LOW 30 MIN: CPT | Mod: TIER,S$GLB,, | Performed by: NURSE PRACTITIONER

## 2022-01-01 PROCEDURE — U0002: ICD-10-PCS | Mod: QW,S$GLB,, | Performed by: NURSE PRACTITIONER

## 2022-01-01 RX ORDER — CETIRIZINE HYDROCHLORIDE 10 MG/1
TABLET ORAL
COMMUNITY

## 2022-01-01 NOTE — PROGRESS NOTES
"Subjective:       Patient ID: Jennifer Velarde is a 26 y.o. female.    Vitals:  height is 5' 1" (1.549 m) and weight is 54.4 kg (120 lb). Her oral temperature is 98.3 °F (36.8 °C). Her blood pressure is 114/76 and her pulse is 62. Her respiration is 18 and oxygen saturation is 98%.     Chief Complaint: Sinus Problem    Sinus Problem  This is a new problem. The current episode started in the past 7 days (monday). The problem has been waxing and waning since onset. Associated symptoms include congestion, coughing, ear pain and headaches. Treatments tried: zyrtec sudafed aleve 220 ml and 500 zinc vitamin c. The treatment provided mild relief.       Constitution: Positive for fatigue.   HENT: Positive for ear pain, congestion and postnasal drip.    Respiratory: Positive for cough.    Neurological: Positive for headaches.       Objective:      Physical Exam   Constitutional: She is oriented to person, place, and time. She appears well-developed and well-nourished. She is cooperative.  Non-toxic appearance. She does not have a sickly appearance. She does not appear ill. No distress.   HENT:   Head: Normocephalic and atraumatic.   Ears:   Right Ear: Hearing, tympanic membrane, external ear and ear canal normal.   Left Ear: Hearing, tympanic membrane, external ear and ear canal normal.   NOSE/THROAT EXAM NOT PERFORMED TO DECREASE VIRAL TRANSMISSION OF COVID      Comments: NOSE/THROAT EXAM NOT PERFORMED TO DECREASE VIRAL TRANSMISSION OF COVID  Eyes: Conjunctivae and lids are normal. No scleral icterus.   Neck: Trachea normal and phonation normal. Neck supple. No edema present. No erythema present. No neck rigidity present.   Cardiovascular: Normal rate, regular rhythm, normal heart sounds, intact distal pulses and normal pulses.   Pulmonary/Chest: Effort normal and breath sounds normal. No respiratory distress. She has no decreased breath sounds. She has no rhonchi.   Abdominal: Normal appearance.   Musculoskeletal: Normal " range of motion.         General: No deformity or edema. Normal range of motion.   Neurological: She is alert and oriented to person, place, and time. She exhibits normal muscle tone. Coordination normal.   Skin: Skin is warm, dry, intact, not diaphoretic and not pale.   Psychiatric: She has a normal mood and affect. Her speech is normal and behavior is normal. Judgment and thought content normal. Cognition and memory  Nursing note and vitals reviewed.          Assessment:       1. COVID-19          Plan:         COVID-19  -     POCT COVID-19 Rapid Screening    -EUA RISK SCORE OF 0, NOT A CANDIDATE FOR EUA INFUSION     -DISCUSSED QUARANTINE GUIDELINES           COVID-19 Discharge Instructions   About this topic   Coronavirus disease 2019 is also known as COVID-19. It is a viral illness that infects the lungs. It is caused by a virus called SARS-associated coronavirus (SARS-CoV-2).  The signs of COVID-19 most often start a few days after you have been infected. In some people, it takes longer to show signs. Others never show signs of the infection. You may have a cough, fever, shaking chills and it may be hard to breathe. You may be very tired, have muscle aches, a headache or sore throat. Some people have an upset stomach or loose stools. Others lose their sense of smell or taste. You may not have these signs all the time and they may come and go while you are sick.  The virus spreads easily through droplets when you talk, sneeze, or cough. You can pass the virus to others when you are talking close together, singing, hugging, sharing food, or shaking hands. Doctors believe the germs also survive on surfaces like tables, door handles, and telephones. However, this is not a common way that COVID-19 spreads. Doctors believe you can also spread the infection even if you dont have any symptoms, but they do not know how that happens. This is why getting vaccinated is one of the best ways to keep you healthy and slow the  spread of the virus.  Some people have a mild case of COVID-19 and are able to stay at home and away from others until they feel better. Others may need to be in the hospital if they are very sick. Some people with COVID-19 can have some symptoms for weeks or months. People with COVID-19 must isolate themselves. You can start to be around others when your doctor says it is safe to do so.       What care is needed at home?   · Ask your doctor what you need to do when you go home. Make sure you ask questions if you do not understand what the doctor says.  · Drink lots of water, juice, or broth to replace fluids lost from a fever.  · You may use cool mist humidifiers to help ease congestion and coughing.  · Use 2 to 3 pillows to prop yourself up when you lie down to make it easier to breathe and sleep.  · Do not smoke and do not drink beer, wine, and mixed drinks (alcohol).  · To lower the chance of passing the infection to others, get a COVID-19 vaccine after your infection has resolved.  · If you have not been fully vaccinated:  ? Wear a mask over your mouth and nose if you are around others who are not sick. Cloth masks work best if they have more than one layer of fabric.  ? Wash your hands often.  ? Stay home in a separate room, if possible, away from others. Only go out to get medical care.  ? Use a separate bathroom if possible.  ? Do not make food for others.  What follow-up care is needed?   · Your doctor may ask you to make visits to the office to check on your progress. Be sure to keep these visits. Make sure you wear a mask at these visits.  · If you can, tell the staff you have COVID-19 ahead of time so they can take extra care to stop the disease from spreading.  · It may take a few weeks before your health returns to normal.  What drugs may be needed?   The doctor may order drugs to:  · Help with breathing  · Help with fever  · Help with swelling in your airways and lungs  · Control coughing  · Ease a  sore throat  · Help a runny or stuffy nose  Will physical activity be limited?   You may have to limit your physical activity. Talk to your doctor about the right amount of activity for you. If you have been very sick with COVID-19, it can take some time to get your strength back.  Will there be any other care needed?   Doctors do not know how long you can pass the virus on to others after you are sick. This is why it is important to stay in a separate room, if possible, when you are sick. For now, doctors are giving general guidelines for you to follow after you have been sick. Before you go around other people, you should:  · Be fever free for 24 hours without taking any drugs to lower the fever  · Have no symptoms of cough or shortness of breath  · Wait at least 10 days after first having symptoms or your first positive test, and you need to be symptom free as above. Some experts suggest waiting 20 days if you have had a more severe infection.  Talk with your doctor about getting a COVID-19 vaccine.  What problems could happen?   · Fluid loss. This is dehydration.  · Short-term or long-term lung damage  · Heart problems  · Death  When do I need to call the doctor?   · You are having so much trouble breathing that you can only say one or two words at a time.  · You need to sit upright at all times to be able to breathe and/or cannot lie down.  · You are very confused or cannot stay awake.  · Your lips or skin start to turn blue or grey.  · You think you might be having a medical emergency. Some examples of medical emergencies are:  ? Severe chest pain.  ? Not able to speak or move normally.  · You have trouble breathing when talking or sitting still.  · You have new shortness of breath.  · You become weak or dizzy.  · You have very dark urine or do not pass urine for more than 8 hours.  · You have new or worsening COVID-19 symptoms like:  ? Fever  ? Cough  ? Feeling very tired  ? Shaking  chills  ? Headache  ? Trouble swallowing  ? Throwing up  ? Loose stools  ? Reddish purple spots on your fingers or toes  Teach Back: Helping You Understand   The Teach Back Method helps you understand the information we are giving you. After you talk with the staff, tell them in your own words what you learned. This helps to make sure the staff has described each thing clearly. It also helps to explain things that may have been confusing. Before going home, make sure you can do these:  · I can tell you about my condition.  · I can tell you what may help ease my breathing.  · I can tell you what I can do to help avoid passing the infection to others.  · I can tell you what I will do if I have trouble breathing; feel sleepy or confused; or my fingertips, fingernails, skin, or lips are blue.  Where can I learn more?   Centers for Disease Control and Prevention  https://www.cdc.gov/coronavirus/2019-ncov/about/index.html   Centers for Disease Control and Prevention  https://www.cdc.gov/coronavirus/2019-ncov/hcp/disposition-in-home-patients.html   World Health Organization  https://www.who.int/news-room/q-a-detail/v-r-egcvteekzecmb   Last Reviewed Date   2021-10-05  Consumer Information Use and Disclaimer   This information is not specific medical advice and does not replace information you receive from your health care provider. This is only a brief summary of general information. It does NOT include all information about conditions, illnesses, injuries, tests, procedures, treatments, therapies, discharge instructions or life-style choices that may apply to you. You must talk with your health care provider for complete information about your health and treatment options. This information should not be used to decide whether or not to accept your health care providers advice, instructions or recommendations. Only your health care provider has the knowledge and training to provide advice that is right for you.  Copyright    Copyright © 2021 [employer] and its affiliates and/or licensors. All rights reserved.

## 2022-01-01 NOTE — PATIENT INSTRUCTIONS
Patient Education       COVID-19 Discharge Instructions   About this topic   Coronavirus disease 2019 is also known as COVID-19. It is a viral illness that infects the lungs. It is caused by a virus called SARS-associated coronavirus (SARS-CoV-2).  The signs of COVID-19 most often start a few days after you have been infected. In some people, it takes longer to show signs. Others never show signs of the infection. You may have a cough, fever, shaking chills and it may be hard to breathe. You may be very tired, have muscle aches, a headache or sore throat. Some people have an upset stomach or loose stools. Others lose their sense of smell or taste. You may not have these signs all the time and they may come and go while you are sick.  The virus spreads easily through droplets when you talk, sneeze, or cough. You can pass the virus to others when you are talking close together, singing, hugging, sharing food, or shaking hands. Doctors believe the germs also survive on surfaces like tables, door handles, and telephones. However, this is not a common way that COVID-19 spreads. Doctors believe you can also spread the infection even if you dont have any symptoms, but they do not know how that happens. This is why getting vaccinated is one of the best ways to keep you healthy and slow the spread of the virus.  Some people have a mild case of COVID-19 and are able to stay at home and away from others until they feel better. Others may need to be in the hospital if they are very sick. Some people with COVID-19 can have some symptoms for weeks or months. People with COVID-19 must isolate themselves. You can start to be around others when your doctor says it is safe to do so.       What care is needed at home?   · Ask your doctor what you need to do when you go home. Make sure you ask questions if you do not understand what the doctor says.  · Drink lots of water, juice, or broth to replace fluids lost from a fever.  · You  may use cool mist humidifiers to help ease congestion and coughing.  · Use 2 to 3 pillows to prop yourself up when you lie down to make it easier to breathe and sleep.  · Do not smoke and do not drink beer, wine, and mixed drinks (alcohol).  · To lower the chance of passing the infection to others, get a COVID-19 vaccine after your infection has resolved.  · If you have not been fully vaccinated:  ? Wear a mask over your mouth and nose if you are around others who are not sick. Cloth masks work best if they have more than one layer of fabric.  ? Wash your hands often.  ? Stay home in a separate room, if possible, away from others. Only go out to get medical care.  ? Use a separate bathroom if possible.  ? Do not make food for others.  What follow-up care is needed?   · Your doctor may ask you to make visits to the office to check on your progress. Be sure to keep these visits. Make sure you wear a mask at these visits.  · If you can, tell the staff you have COVID-19 ahead of time so they can take extra care to stop the disease from spreading.  · It may take a few weeks before your health returns to normal.  What drugs may be needed?   The doctor may order drugs to:  · Help with breathing  · Help with fever  · Help with swelling in your airways and lungs  · Control coughing  · Ease a sore throat  · Help a runny or stuffy nose  Will physical activity be limited?   You may have to limit your physical activity. Talk to your doctor about the right amount of activity for you. If you have been very sick with COVID-19, it can take some time to get your strength back.  Will there be any other care needed?   Doctors do not know how long you can pass the virus on to others after you are sick. This is why it is important to stay in a separate room, if possible, when you are sick. For now, doctors are giving general guidelines for you to follow after you have been sick. Before you go around other people, you should:  · Be fever  free for 24 hours without taking any drugs to lower the fever  · Have no symptoms of cough or shortness of breath  · Wait at least 10 days after first having symptoms or your first positive test, and you need to be symptom free as above. Some experts suggest waiting 20 days if you have had a more severe infection.  Talk with your doctor about getting a COVID-19 vaccine.  What problems could happen?   · Fluid loss. This is dehydration.  · Short-term or long-term lung damage  · Heart problems  · Death  When do I need to call the doctor?   · You are having so much trouble breathing that you can only say one or two words at a time.  · You need to sit upright at all times to be able to breathe and/or cannot lie down.  · You are very confused or cannot stay awake.  · Your lips or skin start to turn blue or grey.  · You think you might be having a medical emergency. Some examples of medical emergencies are:  ? Severe chest pain.  ? Not able to speak or move normally.  · You have trouble breathing when talking or sitting still.  · You have new shortness of breath.  · You become weak or dizzy.  · You have very dark urine or do not pass urine for more than 8 hours.  · You have new or worsening COVID-19 symptoms like:  ? Fever  ? Cough  ? Feeling very tired  ? Shaking chills  ? Headache  ? Trouble swallowing  ? Throwing up  ? Loose stools  ? Reddish purple spots on your fingers or toes  Teach Back: Helping You Understand   The Teach Back Method helps you understand the information we are giving you. After you talk with the staff, tell them in your own words what you learned. This helps to make sure the staff has described each thing clearly. It also helps to explain things that may have been confusing. Before going home, make sure you can do these:  · I can tell you about my condition.  · I can tell you what may help ease my breathing.  · I can tell you what I can do to help avoid passing the infection to others.  · I can tell  you what I will do if I have trouble breathing; feel sleepy or confused; or my fingertips, fingernails, skin, or lips are blue.  Where can I learn more?   Centers for Disease Control and Prevention  https://www.cdc.gov/coronavirus/2019-ncov/about/index.html   Centers for Disease Control and Prevention  https://www.cdc.gov/coronavirus/2019-ncov/hcp/disposition-in-home-patients.html   World Health Organization  https://www.who.int/news-room/q-a-detail/b-k-ljaabxqsinkmp   Last Reviewed Date   2021-10-05  Consumer Information Use and Disclaimer   This information is not specific medical advice and does not replace information you receive from your health care provider. This is only a brief summary of general information. It does NOT include all information about conditions, illnesses, injuries, tests, procedures, treatments, therapies, discharge instructions or life-style choices that may apply to you. You must talk with your health care provider for complete information about your health and treatment options. This information should not be used to decide whether or not to accept your health care providers advice, instructions or recommendations. Only your health care provider has the knowledge and training to provide advice that is right for you.  Copyright   Copyright © 2021 [employer] and its affiliates and/or licensors. All rights reserved.

## 2022-01-02 ENCOUNTER — PATIENT MESSAGE (OUTPATIENT)
Dept: ADMINISTRATIVE | Facility: OTHER | Age: 27
End: 2022-01-02

## 2023-07-02 ENCOUNTER — OFFICE VISIT (OUTPATIENT)
Dept: URGENT CARE | Facility: CLINIC | Age: 28
End: 2023-07-02
Payer: COMMERCIAL

## 2023-07-02 VITALS
BODY MASS INDEX: 22.66 KG/M2 | TEMPERATURE: 98 F | DIASTOLIC BLOOD PRESSURE: 73 MMHG | HEIGHT: 61 IN | SYSTOLIC BLOOD PRESSURE: 113 MMHG | HEART RATE: 76 BPM | WEIGHT: 120 LBS | RESPIRATION RATE: 18 BRPM | OXYGEN SATURATION: 99 %

## 2023-07-02 DIAGNOSIS — R05.9 COUGH, UNSPECIFIED TYPE: ICD-10-CM

## 2023-07-02 DIAGNOSIS — U07.1 COVID-19: Primary | ICD-10-CM

## 2023-07-02 LAB
CTP QC/QA: YES
SARS-COV-2 AG RESP QL IA.RAPID: POSITIVE

## 2023-07-02 PROCEDURE — 87811 SARS-COV-2 COVID19 W/OPTIC: CPT | Mod: QW,S$GLB,,

## 2023-07-02 PROCEDURE — 99203 OFFICE O/P NEW LOW 30 MIN: CPT | Mod: S$GLB,,,

## 2023-07-02 PROCEDURE — 87811 SARS CORONAVIRUS 2 ANTIGEN POCT, MANUAL READ: ICD-10-PCS | Mod: QW,S$GLB,,

## 2023-07-02 PROCEDURE — 99203 PR OFFICE/OUTPT VISIT, NEW, LEVL III, 30-44 MIN: ICD-10-PCS | Mod: S$GLB,,,

## 2023-07-02 NOTE — PROGRESS NOTES
"Subjective:      Patient ID: Jennifer Velarde is a 28 y.o. female.    Vitals:  height is 5' 1" (1.549 m) and weight is 54.4 kg (120 lb). Her temperature is 98.3 °F (36.8 °C). Her blood pressure is 113/73 and her pulse is 76. Her respiration is 18 and oxygen saturation is 99%.     Chief Complaint: Sore Throat (Congestion.  Runny nose   Fatigue - Entered by patient)    Pt complains x3 days of sore throat, sinus pressure, post nasal drip, nasal congestion, headache, cough, fatigue. Took tylenol with mild relief.     Sore Throat   This is a new problem. The current episode started yesterday. The problem has been gradually worsening. There has been no fever. The pain is at a severity of 2/10. The patient is experiencing no pain. Associated symptoms include congestion, coughing, headaches and trouble swallowing. Pertinent negatives include no abdominal pain, diarrhea, drooling, ear discharge, ear pain, hoarse voice, plugged ear sensation, neck pain, shortness of breath, stridor, swollen glands or vomiting. Exposure to: covid.     HENT:  Positive for congestion, sore throat and trouble swallowing. Negative for ear pain, ear discharge and drooling.    Neck: Negative for neck pain.   Respiratory:  Positive for cough. Negative for shortness of breath and stridor.    Gastrointestinal:  Negative for abdominal pain, vomiting and diarrhea.   Neurological:  Positive for headaches. Negative for disorientation and altered mental status.   Psychiatric/Behavioral:  Negative for altered mental status and disorientation.     Objective:     Physical Exam   Constitutional: She is oriented to person, place, and time. She appears well-developed. She is cooperative.  Non-toxic appearance. She does not appear ill. No distress.      Comments:Patient sits comfortably in exam chair. Answers questions in complete sentences. Does not show any signs of distress or discoloration.        HENT:   Head: Normocephalic and atraumatic.   Ears:   Right " Ear: Hearing, tympanic membrane, external ear and ear canal normal. impacted cerumen  Left Ear: Hearing, tympanic membrane, external ear and ear canal normal. impacted cerumen  Nose: Rhinorrhea and congestion present. No mucosal edema or nasal deformity. No epistaxis. Right sinus exhibits no maxillary sinus tenderness and no frontal sinus tenderness. Left sinus exhibits no maxillary sinus tenderness and no frontal sinus tenderness.   Mouth/Throat: Uvula is midline and mucous membranes are normal. No trismus in the jaw. Normal dentition. No uvula swelling. Posterior oropharyngeal erythema present. No oropharyngeal exudate or posterior oropharyngeal edema.   Eyes: Conjunctivae and lids are normal. No scleral icterus.   Neck: Trachea normal and phonation normal. Neck supple. No edema present. No erythema present. No neck rigidity present.   Cardiovascular: Normal rate, regular rhythm, normal heart sounds and normal pulses.   Pulmonary/Chest: Effort normal and breath sounds normal. No stridor. No respiratory distress. She has no decreased breath sounds. She has no wheezes. She has no rhonchi. She has no rales.   Abdominal: Normal appearance.   Musculoskeletal: Normal range of motion.         General: No deformity. Normal range of motion.   Lymphadenopathy:     She has no cervical adenopathy.        Right cervical: No superficial cervical, no deep cervical and no posterior cervical adenopathy present.       Left cervical: No superficial cervical, no deep cervical and no posterior cervical adenopathy present.   Neurological: She is alert and oriented to person, place, and time. She exhibits normal muscle tone. Coordination normal.   Skin: Skin is warm, dry, intact, not diaphoretic and not pale.   Psychiatric: Her speech is normal and behavior is normal. Judgment and thought content normal.   Nursing note and vitals reviewed.  Results for orders placed or performed in visit on 07/02/23   SARS Coronavirus 2 Antigen, POCT  Manual Read   Result Value Ref Range    SARS Coronavirus 2 Antigen Positive (A) Negative     Acceptable Yes        Assessment:     1. COVID-19    2. Cough, unspecified type        Plan:       COVID-19    Cough, unspecified type  -     SARS Coronavirus 2 Antigen, POCT Manual Read             Patient Instructions    You have tested positive for COVID-19 today.       ISOLATION  If you tested positive and do not have symptoms, you must isolate for 5 days starting on the day of the positive test. I     If you tested positive and have symptoms, you must isolate for 5 days starting on the day of the first symptoms,  not the day of the positive test.     This is the most important part, both the CDC and the [facility] emphasize that you do not test out of isolation.     After 5 days, if your symptoms have improved and you have not had fever on day 5, you can return to the community on day 6- NO TESTING REQUIRED!      In fact, we do not retest if you were positive in the last 90 days.     After your 5 days of isolation are completed, the CDC recommends strict mask use for the first 5 days that you come out of isolation.      CDC Testing and Quarantine Guidelines for patients with exposure to a known-positive COVID-19 person:  ·     A 'close exposure' is defined as anyone who has had an exposure (masked or unmasked) to a known COVID -19 positive person          within 6 feet of someone          for a cumulative total of 15 minutes or more over a 24-hour period.  ·     vaccinated Have been boosted or completed the primary series of Pfizer or Moderna vaccine within the last 6 months or completed the primary series of J&J vaccine within the last 2 months and/or had a positive test within 90 days          do NOT need to quarantine after contact with someone who had COVID-19 unless they have symptoms.          fully vaccinated people who have not had a positive test within 90 days, should get tested 3-5 days after their  exposure, even if they don't have symptoms and wear a mask indoors in public for 10 days following exposure or until their test result is negative on day 5.          If you develop symptoms test and quarantine.     ·     Unvaccinated, or are more than six months out from their second mRNA dose (or more than 2 months after the J&J vaccine) and not yet boosted,  and/or NOT had a positive test within 90 days and meet 'close exposure'  you are required by CDC guidelines to quarantine for at least 5 days from time of exposure followed by 5 days of strict masking. It is recommended, but not required to test after 5 days, unless you develop symptoms, in which case you should test at that time.  If you do decide to test at 5 days and are asymptomatic, the risk is that if you test without symptoms on Day 5 for example) and you are positive, your 5 day isolation begins on that day, and you turned your 5 day quarantine into 10 days.          If your exposure does not meet the above definition, you can return to your normal daily activities to include social distancing, wearing a mask and frequent handwashing.  Alternatively, if a 5-day quarantine is not feasible, it is imperative that an exposed person wear a well-fitting mask at all times when around others for 10 days after exposure.            - Rest.    - Drink plenty of fluids.  - Viral upper respiratory infections typically run their course in 10-14 days.      - You can take over-the-counter claritin, zyrtec, allegra, or xyzal as directed. These are antihistamines that can help with runny nose, nasal congestion, sneezing, and helps to dry up post-nasal drip, which usually causes sore throat and cough.    - You can take plain Mucinex (guaifenesin) 1200 mg twice a day to help loosen mucous.     - If you do NOT have high blood pressure, you may use a decongestant form (D)  of this medication (ie. Claritin- D, zyrtec-D, allegra-D) or if you do not take the D form, you can  take sudafed (pseudoephedrine) over the counter, which is a decongestant. Do NOT take two decongestant (D) medications at the same time (such as mucinex-D and claritin-D or plain sudafed and claritin D). Dextromethorphan (DM) is a cough suppressant over the counter (ie. mucinex DM, robitussin, delsym; dayquil/nyquil has DM as well.)     - You can use Flonase (fluticasone) nasal spray as directed for sinus congestion and postnasal drip. This is a steroid nasal spray that works locally over time to decrease the inflammation in your nose/sinuses and help with allergic symptoms. This is not an quick- relief spray like afrin, but it works well if used daily.  Discontinue if you develop nose bleed  - Use nasal saline prior to Flonase.  - Use Ocean Spray Nasal Saline 1-3 puffs each nostril every 2-3 hours then blow out onto tissue. This is to irrigate the nasal passage way to clear the sinus openings. Use until sinus problem resolved.    - A Neti Pot with sterile saline can help break up nasal congestion and give relief.      - Warm salt water gargles can help with sore throat     - Warm tea with honey can help with sore throat and cough. Honey is a natural cough suppressant.     - Acetaminophen (tylenol) or Ibuprofen (advil,motrin) as directed as needed for fever/pain. Avoid tylenol if you have a history of liver disease. Do not take ibuprofen if you have a history of GI bleeding, kidney disease, or if you take blood thinners.   - Ibuprofen dosing for adults: 400 mg by mouth every 4-6 hours as needed. Max: 2400 mg/day; Info: use lowest effective dose, shortest effective treatment duration; give w/ food if GI upset occurs.  - Tylenol dosing for adults: [By mouth route, immediate-release form] Dose: 325-1000 mg by mouth every 4-6h as needed; Max: 1 g/4h and 4 g/day from all sources. [By mouth route, extended-release form] Dose: 650-1300 mg Extended Release by mouth every 8h as needed; Max: 4 g/day from all sources.     -  You must understand that you have received an Urgent Care treatment only and that you may be released before all of your medical problems are known or treated.   - You, the patient, will arrange for follow up care as instructed.   - If your condition worsens or fails to improve we recommend that you receive another evaluation at the ER immediately or contact your PCP to discuss your concerns or return here.   - Follow up with your PCP or specialty clinic as directed in the next 1-2 weeks if not improved or as needed.  You can call [phone] to schedule an appointment with the appropriate provider.    If your symptoms do not improve or worsen, go to the emergency room immediately.

## 2023-07-02 NOTE — PATIENT INSTRUCTIONS
You have tested positive for COVID-19 today.       ISOLATION  If you tested positive and do not have symptoms, you must isolate for 5 days starting on the day of the positive test. I     If you tested positive and have symptoms, you must isolate for 5 days starting on the day of the first symptoms,  not the day of the positive test.     This is the most important part, both the CDC and the [facility] emphasize that you do not test out of isolation.     After 5 days, if your symptoms have improved and you have not had fever on day 5, you can return to the community on day 6- NO TESTING REQUIRED!      In fact, we do not retest if you were positive in the last 90 days.     After your 5 days of isolation are completed, the CDC recommends strict mask use for the first 5 days that you come out of isolation.      CDC Testing and Quarantine Guidelines for patients with exposure to a known-positive COVID-19 person:  ·     A 'close exposure' is defined as anyone who has had an exposure (masked or unmasked) to a known COVID -19 positive person          within 6 feet of someone          for a cumulative total of 15 minutes or more over a 24-hour period.  ·     vaccinated Have been boosted or completed the primary series of Pfizer or Moderna vaccine within the last 6 months or completed the primary series of J&J vaccine within the last 2 months and/or had a positive test within 90 days          do NOT need to quarantine after contact with someone who had COVID-19 unless they have symptoms.          fully vaccinated people who have not had a positive test within 90 days, should get tested 3-5 days after their exposure, even if they don't have symptoms and wear a mask indoors in public for 10 days following exposure or until their test result is negative on day 5.          If you develop symptoms test and quarantine.     ·     Unvaccinated, or are more than six months out from their second mRNA dose (or more than 2 months after the J&J  vaccine) and not yet boosted,  and/or NOT had a positive test within 90 days and meet 'close exposure'  you are required by CDC guidelines to quarantine for at least 5 days from time of exposure followed by 5 days of strict masking. It is recommended, but not required to test after 5 days, unless you develop symptoms, in which case you should test at that time.  If you do decide to test at 5 days and are asymptomatic, the risk is that if you test without symptoms on Day 5 for example) and you are positive, your 5 day isolation begins on that day, and you turned your 5 day quarantine into 10 days.          If your exposure does not meet the above definition, you can return to your normal daily activities to include social distancing, wearing a mask and frequent handwashing.  Alternatively, if a 5-day quarantine is not feasible, it is imperative that an exposed person wear a well-fitting mask at all times when around others for 10 days after exposure.            - Rest.    - Drink plenty of fluids.  - Viral upper respiratory infections typically run their course in 10-14 days.      - You can take over-the-counter claritin, zyrtec, allegra, or xyzal as directed. These are antihistamines that can help with runny nose, nasal congestion, sneezing, and helps to dry up post-nasal drip, which usually causes sore throat and cough.    - You can take plain Mucinex (guaifenesin) 1200 mg twice a day to help loosen mucous.     - If you do NOT have high blood pressure, you may use a decongestant form (D)  of this medication (ie. Claritin- D, zyrtec-D, allegra-D) or if you do not take the D form, you can take sudafed (pseudoephedrine) over the counter, which is a decongestant. Do NOT take two decongestant (D) medications at the same time (such as mucinex-D and claritin-D or plain sudafed and claritin D). Dextromethorphan (DM) is a cough suppressant over the counter (ie. mucinex DM, robitussin, delsym; dayquil/nyquil has DM as  well.)     - You can use Flonase (fluticasone) nasal spray as directed for sinus congestion and postnasal drip. This is a steroid nasal spray that works locally over time to decrease the inflammation in your nose/sinuses and help with allergic symptoms. This is not an quick- relief spray like afrin, but it works well if used daily.  Discontinue if you develop nose bleed  - Use nasal saline prior to Flonase.  - Use Ocean Spray Nasal Saline 1-3 puffs each nostril every 2-3 hours then blow out onto tissue. This is to irrigate the nasal passage way to clear the sinus openings. Use until sinus problem resolved.    - A Neti Pot with sterile saline can help break up nasal congestion and give relief.      - Warm salt water gargles can help with sore throat     - Warm tea with honey can help with sore throat and cough. Honey is a natural cough suppressant.     - Acetaminophen (tylenol) or Ibuprofen (advil,motrin) as directed as needed for fever/pain. Avoid tylenol if you have a history of liver disease. Do not take ibuprofen if you have a history of GI bleeding, kidney disease, or if you take blood thinners.   - Ibuprofen dosing for adults: 400 mg by mouth every 4-6 hours as needed. Max: 2400 mg/day; Info: use lowest effective dose, shortest effective treatment duration; give w/ food if GI upset occurs.  - Tylenol dosing for adults: [By mouth route, immediate-release form] Dose: 325-1000 mg by mouth every 4-6h as needed; Max: 1 g/4h and 4 g/day from all sources. [By mouth route, extended-release form] Dose: 650-1300 mg Extended Release by mouth every 8h as needed; Max: 4 g/day from all sources.     - You must understand that you have received an Urgent Care treatment only and that you may be released before all of your medical problems are known or treated.   - You, the patient, will arrange for follow up care as instructed.   - If your condition worsens or fails to improve we recommend that you receive another evaluation at  the ER immediately or contact your PCP to discuss your concerns or return here.   - Follow up with your PCP or specialty clinic as directed in the next 1-2 weeks if not improved or as needed.  You can call [phone] to schedule an appointment with the appropriate provider.    If your symptoms do not improve or worsen, go to the emergency room immediately.

## 2023-07-03 ENCOUNTER — PATIENT MESSAGE (OUTPATIENT)
Dept: RESEARCH | Facility: HOSPITAL | Age: 28
End: 2023-07-03
Payer: COMMERCIAL

## 2023-08-15 ENCOUNTER — OFFICE VISIT (OUTPATIENT)
Dept: URGENT CARE | Facility: CLINIC | Age: 28
End: 2023-08-15
Payer: COMMERCIAL

## 2023-08-15 VITALS
HEART RATE: 79 BPM | OXYGEN SATURATION: 97 % | SYSTOLIC BLOOD PRESSURE: 103 MMHG | WEIGHT: 120 LBS | BODY MASS INDEX: 22.66 KG/M2 | DIASTOLIC BLOOD PRESSURE: 68 MMHG | TEMPERATURE: 99 F | RESPIRATION RATE: 16 BRPM | HEIGHT: 61 IN

## 2023-08-15 DIAGNOSIS — J06.9 VIRAL URI: Primary | ICD-10-CM

## 2023-08-15 PROBLEM — H10.10 ACUTE ATOPIC CONJUNCTIVITIS: Status: ACTIVE | Noted: 2023-08-15

## 2023-08-15 PROBLEM — Z97.5 IUD (INTRAUTERINE DEVICE) IN PLACE: Status: ACTIVE | Noted: 2020-08-27

## 2023-08-15 PROBLEM — J45.20 MILD INTERMITTENT ASTHMA: Status: ACTIVE | Noted: 2023-08-15

## 2023-08-15 PROBLEM — J30.9 ALLERGIC RHINITIS: Status: ACTIVE | Noted: 2023-08-15

## 2023-08-15 PROBLEM — J45.909 ASTHMA WITHOUT STATUS ASTHMATICUS: Status: ACTIVE | Noted: 2023-08-15

## 2023-08-15 LAB
CTP QC/QA: YES
MOLECULAR STREP A: NEGATIVE

## 2023-08-15 PROCEDURE — 99213 OFFICE O/P EST LOW 20 MIN: CPT | Mod: S$GLB,,, | Performed by: PHYSICIAN ASSISTANT

## 2023-08-15 PROCEDURE — 99213 PR OFFICE/OUTPT VISIT, EST, LEVL III, 20-29 MIN: ICD-10-PCS | Mod: S$GLB,,, | Performed by: PHYSICIAN ASSISTANT

## 2023-08-15 PROCEDURE — 87651 POCT STREP A MOLECULAR: ICD-10-PCS | Mod: QW,S$GLB,, | Performed by: PHYSICIAN ASSISTANT

## 2023-08-15 PROCEDURE — 87651 STREP A DNA AMP PROBE: CPT | Mod: QW,S$GLB,, | Performed by: PHYSICIAN ASSISTANT

## 2023-08-15 RX ORDER — LEVOCETIRIZINE DIHYDROCHLORIDE 5 MG/1
5 TABLET, FILM COATED ORAL EVERY EVENING
COMMUNITY

## 2023-08-15 RX ORDER — FLUTICASONE PROPIONATE 50 MCG
1 SPRAY, SUSPENSION (ML) NASAL DAILY
Qty: 16 G | Refills: 0 | Status: SHIPPED | OUTPATIENT
Start: 2023-08-15 | End: 2023-08-22

## 2023-08-15 NOTE — PROGRESS NOTES
"Subjective:      Patient ID: Jennifer Velarde is a 28 y.o. female.    Vitals:  height is 5' 1" (1.549 m) and weight is 54.4 kg (120 lb). Her oral temperature is 98.5 °F (36.9 °C). Her blood pressure is 103/68 and her pulse is 79. Her respiration is 16 and oxygen saturation is 97%.     Chief Complaint: Sore Throat    This is a 28 y.o. female who presents today with a chief complaint of sore throat with trouble swallowing, fatigue and bodyaches that started yesterday. Pt stated that her symptoms has gotten better today. Pt stated that she was here for the same symptoms on 07/02/23 and tested positive for covid.       Sore Throat   This is a new problem. The current episode started yesterday. The problem has been unchanged. Neither side of throat is experiencing more pain than the other. There has been no fever. The pain is at a severity of 5/10. The pain is moderate. Associated symptoms include headaches, swollen glands and trouble swallowing. Pertinent negatives include no abdominal pain, congestion, coughing, diarrhea, drooling, ear discharge, ear pain, hoarse voice, plugged ear sensation, neck pain, shortness of breath, stridor or vomiting. She has had no exposure to strep or mono. She has tried nothing for the symptoms.     Constitution: Positive for fatigue. Negative for appetite change, chills and fever.   HENT:  Positive for postnasal drip, sore throat and trouble swallowing. Negative for ear pain, ear discharge, tinnitus, hearing loss, drooling, congestion, sinus pain, sinus pressure and voice change.    Neck: Negative for neck pain, neck stiffness and painful lymph nodes.   Cardiovascular:  Negative for chest pain and sob on exertion.   Eyes:  Negative for eye discharge and eye itching.   Respiratory:  Negative for cough, shortness of breath and stridor.    Gastrointestinal:  Negative for abdominal pain, nausea, vomiting, constipation and diarrhea.   Musculoskeletal:  Negative for muscle cramps and muscle " ache.   Skin:  Negative for color change, pale and rash.   Neurological:  Positive for headaches. Negative for dizziness and altered mental status.   Hematologic/Lymphatic: Negative for swollen lymph nodes.   Psychiatric/Behavioral:  Negative for altered mental status.      Past Medical History:   Diagnosis Date    Allergy        History reviewed. No pertinent surgical history.    Family History   Problem Relation Age of Onset    Breast cancer Neg Hx     Colon cancer Neg Hx     Ovarian cancer Neg Hx        Social History     Socioeconomic History    Marital status: Single   Tobacco Use    Smoking status: Never    Smokeless tobacco: Never   Substance and Sexual Activity    Alcohol use: Yes    Drug use: No    Sexual activity: Yes     Birth control/protection: OCP       Current Outpatient Medications   Medication Sig Dispense Refill    azelastine-fluticasone (DYMISTA) 137-50 mcg/spray Spry nassal spray       cetirizine (ZYRTEC) 10 MG tablet 1 tablet      levocetirizine (XYZAL) 5 MG tablet Take 5 mg by mouth once daily.      levonorgestrel-ethinyl estradiol (AVIANE,ALESSE,LESSINA) 0.1-20 mg-mcg per tablet Take 1 tablet by mouth once daily.      fluticasone propionate (FLONASE) 50 mcg/actuation nasal spray 1 spray (50 mcg total) by Each Nostril route once daily. for 7 days 16 g 0    levonorgestrel (MIRENA) 20 mcg/24 hours (5 yrs) 52 mg IUD 1 Intra Uterine Device by Intrauterine route once. for 1 dose 1 Intra Uterine Device 0     Current Facility-Administered Medications   Medication Dose Route Frequency Provider Last Rate Last Admin    levonorgestrel 20 mcg/24 hours (5 yrs) 52 mg IUD 1 Intra Uterine Device  1 Intra Uterine Device Intrauterine  Elenita Landon MD   1 Intra Uterine Device at 05/07/19 0911       Review of patient's allergies indicates:   Allergen Reactions    Cat dander Itching, Shortness Of Breath and Swelling    Feathers Itching    House dust Itching    Mold Itching    Oak bark     Pollen extracts  Itching        Objective:     Physical Exam   Constitutional: She is oriented to person, place, and time. She appears well-developed. She is cooperative.  Non-toxic appearance. She does not appear ill. No distress.   HENT:   Head: Normocephalic and atraumatic.   Ears:   Right Ear: Hearing, external ear and ear canal normal. Tympanic membrane is not injected, not erythematous, not retracted and not bulging. A middle ear effusion is present. impacted cerumen  Left Ear: Hearing, external ear and ear canal normal. Tympanic membrane is not injected, not erythematous, not retracted and not bulging. A middle ear effusion is present. impacted cerumen  Nose: Rhinorrhea and congestion present. No mucosal edema or nasal deformity. No epistaxis. Right sinus exhibits no maxillary sinus tenderness and no frontal sinus tenderness. Left sinus exhibits no maxillary sinus tenderness and no frontal sinus tenderness.   Mouth/Throat: Uvula is midline and mucous membranes are normal. Mucous membranes are moist. No oral lesions. No trismus in the jaw. Normal dentition. No uvula swelling or lacerations. Posterior oropharyngeal erythema present. No oropharyngeal exudate, posterior oropharyngeal edema, tonsillar abscesses or cobblestoning. Tonsillar exudate. Oropharynx is clear.   Eyes: Conjunctivae and lids are normal. Right eye exhibits no discharge. Left eye exhibits no discharge. No scleral icterus.   Neck: Trachea normal and phonation normal. Neck supple. No edema present. No erythema present. No neck rigidity present.   Cardiovascular: Normal rate, regular rhythm, normal heart sounds and normal pulses.   No murmur heard.Exam reveals no gallop and no friction rub.   Pulmonary/Chest: Effort normal and breath sounds normal. No stridor. No respiratory distress. She has no decreased breath sounds. She has no wheezes. She has no rhonchi. She has no rales. She exhibits no tenderness.   Abdominal: Normal appearance.   Musculoskeletal:       "Cervical back: She exhibits tenderness.   Lymphadenopathy:     She has no cervical adenopathy.   Neurological: no focal deficit. She is alert, oriented to person, place, and time and at baseline. She exhibits normal muscle tone. Coordination normal.   Skin: Skin is warm, dry, intact, not diaphoretic, not pale and no rash.   Psychiatric: Her speech is normal and behavior is normal. Mood, judgment and thought content normal.   Nursing note and vitals reviewed.    Results for orders placed or performed in visit on 08/15/23   POCT Strep A, Molecular   Result Value Ref Range    Molecular Strep A, POC Negative Negative     Acceptable Yes          Assessment:     1. Viral URI        Plan:       Viral URI  -     POCT Strep A, Molecular  -     fluticasone propionate (FLONASE) 50 mcg/actuation nasal spray; 1 spray (50 mcg total) by Each Nostril route once daily. for 7 days  Dispense: 16 g; Refill: 0    Results reviewed  I have reviewed the patient chart and pertinent past imaging/labs.  Patient Instructions                                                            URI   If your condition worsens or fails to improve we recommend that you receive another evaluation at the urgent care/ER immediately or contact your PCP to discuss your concerns. You must understand that you've received an urgent care treatment only and that you may be released before all your medical problems are known or treated. You the patient will arrange for follouwp care as instructed.   If we discussed that I think your illness is viral, it will not respond to antibiotics and will last 10-14 days.       Flonase (fluticasone) is a nasal spray which is available over the counter and may help with your symptoms.   Zyrtec D, Claritin D or Allegra D can also help with symptoms of congestion and drainage.   If you have hypertension avoid using the "D" which is the decongestant   If you just have drainage you can take plain zyrtec, claritin or " allegra   If you just have a congested feeling you can take pseudoephedrine (unless you have high blood pressure) which you have to sign for behind the counter.   Rest and fluids are also important.     Salt water gargles, warm tea with honey and chloraseptic spray as needed for sore throat.   Tylenol or ibuprofen can also be used as directed for pain unless you have an allergy to them or medical condition such as stomach ulcers, kidney or liver disease or blood thinners etc for which you should not be taking these type of medications.

## 2023-08-15 NOTE — PATIENT INSTRUCTIONS
"                                                         URI   If your condition worsens or fails to improve we recommend that you receive another evaluation at the urgent care/ER immediately or contact your PCP to discuss your concerns. You must understand that you've received an urgent care treatment only and that you may be released before all your medical problems are known or treated. You the patient will arrange for follouwp care as instructed.   If we discussed that I think your illness is viral, it will not respond to antibiotics and will last 10-14 days.       Flonase (fluticasone) is a nasal spray which is available over the counter and may help with your symptoms.   Zyrtec D, Claritin D or Allegra D can also help with symptoms of congestion and drainage.   If you have hypertension avoid using the "D" which is the decongestant   If you just have drainage you can take plain zyrtec, claritin or allegra   If you just have a congested feeling you can take pseudoephedrine (unless you have high blood pressure) which you have to sign for behind the counter.   Rest and fluids are also important.     Salt water gargles, warm tea with honey and chloraseptic spray as needed for sore throat.   Tylenol or ibuprofen can also be used as directed for pain unless you have an allergy to them or medical condition such as stomach ulcers, kidney or liver disease or blood thinners etc for which you should not be taking these type of medications.         "

## 2023-10-02 ENCOUNTER — OFFICE VISIT (OUTPATIENT)
Dept: URGENT CARE | Facility: CLINIC | Age: 28
End: 2023-10-02
Payer: COMMERCIAL

## 2023-10-02 VITALS
HEART RATE: 85 BPM | SYSTOLIC BLOOD PRESSURE: 114 MMHG | TEMPERATURE: 98 F | OXYGEN SATURATION: 98 % | WEIGHT: 120 LBS | HEIGHT: 61 IN | DIASTOLIC BLOOD PRESSURE: 79 MMHG | BODY MASS INDEX: 22.66 KG/M2 | RESPIRATION RATE: 16 BRPM

## 2023-10-02 DIAGNOSIS — J02.9 SORE THROAT: ICD-10-CM

## 2023-10-02 DIAGNOSIS — J03.90 ACUTE TONSILLITIS, UNSPECIFIED ETIOLOGY: Primary | ICD-10-CM

## 2023-10-02 LAB
CTP QC/QA: YES
MOLECULAR STREP A: NEGATIVE

## 2023-10-02 PROCEDURE — 99213 PR OFFICE/OUTPT VISIT, EST, LEVL III, 20-29 MIN: ICD-10-PCS | Mod: S$GLB,,, | Performed by: NURSE PRACTITIONER

## 2023-10-02 PROCEDURE — 99213 OFFICE O/P EST LOW 20 MIN: CPT | Mod: S$GLB,,, | Performed by: NURSE PRACTITIONER

## 2023-10-02 PROCEDURE — 87651 POCT STREP A MOLECULAR: ICD-10-PCS | Mod: QW,S$GLB,, | Performed by: NURSE PRACTITIONER

## 2023-10-02 PROCEDURE — 87651 STREP A DNA AMP PROBE: CPT | Mod: QW,S$GLB,, | Performed by: NURSE PRACTITIONER

## 2023-10-02 RX ORDER — MOXIFLOXACIN 5 MG/ML
SOLUTION/ DROPS OPHTHALMIC
COMMUNITY
Start: 2023-09-20

## 2023-10-02 NOTE — PROGRESS NOTES
"Subjective:      Patient ID: Jennifer Velarde is a 28 y.o. female.    Vitals:  height is 5' 1" (1.549 m) and weight is 54.4 kg (120 lb). Her oral temperature is 98.2 °F (36.8 °C). Her blood pressure is 114/79 and her pulse is 85. Her respiration is 16 and oxygen saturation is 98%.     Chief Complaint: Sore Throat (Pt is coming in today for sore throat (trouble swallowing) black spot ulcer like w/ puss in the back lateral side wall of her throat that started last night. She feels like her glands are swollen as well. )    Pt is coming in today for sore throat (trouble swallowing) black spot ulcer like w/ puss in the back of her throat that started last night. She feels like her glands are swollen as well.  Denies fever or URI symptoms.  Recently had a cold sore 2 days before this started but resolved w/ valtrex.    Sore Throat   This is a new problem. The current episode started yesterday. The problem has been gradually worsening. Neither side of throat is experiencing more pain than the other. There has been no fever. The pain is moderate. Associated symptoms include swollen glands and trouble swallowing. Pertinent negatives include no abdominal pain, congestion, coughing, diarrhea, drooling, ear discharge, ear pain, headaches, hoarse voice, plugged ear sensation, neck pain, shortness of breath, stridor or vomiting. She has tried nothing for the symptoms. The treatment provided no relief.       Constitution: Negative for chills, fatigue and fever.   HENT:  Positive for sore throat and trouble swallowing. Negative for ear pain, ear discharge, drooling and congestion.    Neck: Negative for neck pain.   Respiratory:  Negative for cough, shortness of breath and stridor.    Gastrointestinal:  Negative for abdominal pain, vomiting and diarrhea.   Neurological:  Negative for headaches.      Objective:     Physical Exam   Constitutional: She is oriented to person, place, and time. She appears well-developed. She is " cooperative.  Non-toxic appearance. She does not appear ill. No distress.   HENT:   Head: Normocephalic and atraumatic.   Ears:   Right Ear: Hearing, tympanic membrane, external ear and ear canal normal.   Left Ear: Hearing, tympanic membrane, external ear and ear canal normal.   Nose: Nose normal. No mucosal edema, rhinorrhea or nasal deformity. No epistaxis. Right sinus exhibits no maxillary sinus tenderness and no frontal sinus tenderness. Left sinus exhibits no maxillary sinus tenderness and no frontal sinus tenderness.   Mouth/Throat: Uvula is midline, oropharynx is clear and moist and mucous membranes are normal. No trismus in the jaw. Normal dentition. No uvula swelling. No oropharyngeal exudate, posterior oropharyngeal edema, posterior oropharyngeal erythema or tonsillar abscesses. Tonsils are 1+ on the right. Tonsils are 1+ on the left. Tonsillar exudate.   Eyes: Conjunctivae and lids are normal. No scleral icterus.   Neck: Trachea normal and phonation normal. Neck supple. No edema present. No erythema present. No neck rigidity present.   Cardiovascular: Normal rate, regular rhythm, normal heart sounds and normal pulses.   Pulmonary/Chest: Effort normal and breath sounds normal. No respiratory distress. She has no decreased breath sounds. She has no rhonchi.   Abdominal: Normal appearance.   Musculoskeletal: Normal range of motion.         General: No deformity. Normal range of motion.   Lymphadenopathy:     She has no cervical adenopathy.   Neurological: She is alert and oriented to person, place, and time. She exhibits normal muscle tone. Coordination normal.   Skin: Skin is warm, dry, intact, not diaphoretic and not pale.   Psychiatric: Her speech is normal and behavior is normal. Judgment and thought content normal.   Nursing note and vitals reviewed.      Assessment:     1. Acute tonsillitis, unspecified etiology    2. Sore throat        Plan:     Results for orders placed or performed in visit on  10/02/23   POCT Strep A, Molecular   Result Value Ref Range    Molecular Strep A, POC Negative Negative     Acceptable Yes        Acute tonsillitis, unspecified etiology    Sore throat  -     POCT Strep A, Molecular        Patient Instructions                                                         Pharyngitis   If your condition worsens or fails to improve we recommend that you receive another evaluation at the ER immediately or contact your PCP to discuss your concerns or return here. You must understand that you've received an urgent care treatment only and that you may be released before all your medical problems are known or treated. You the patient will arrange for followup care as instructed.   If the strept culture was done and returns negative in 3-5 days and you are still having a sore throat, you may need to get a mono spot test done or repeated.   Tylenol or ibuprofen for pain may help as long as you are not allergic to these meds or have a medical condition such as stomach ulcers, liver or kidney disease or taking blood thinners etc that would   prevent you from using these medications.   Rest and fluids will help as well.   If you were prescribed antibiotics and are female and on BCP use additional methods to prevent pregnancy while on the antibiotics and for one cycle after

## 2023-10-04 ENCOUNTER — NURSE TRIAGE (OUTPATIENT)
Dept: ADMINISTRATIVE | Facility: CLINIC | Age: 28
End: 2023-10-04
Payer: COMMERCIAL

## 2023-10-04 ENCOUNTER — TELEPHONE (OUTPATIENT)
Dept: URGENT CARE | Facility: CLINIC | Age: 28
End: 2023-10-04
Payer: COMMERCIAL

## 2023-10-04 NOTE — TELEPHONE ENCOUNTER
Patient is feeling improved and says that her tonsils are improved and no longer with exudate.  She only has a little fatigue.  She is no longer having new or worsening symptoms.  She was happy for the call and all of her questions were answered.  Advised that it's okay to no longer quarantine as she is improving and did not test positive for anything with no known exposure.

## 2023-10-04 NOTE — TELEPHONE ENCOUNTER
OOC NT return call -  Pt reports seen 10/2/23 at [facility] for Acute tonsillitis, unspecified etiology. Today reports fatigue only. Denies any fever in last 24 hours. Pt concerned with home quarantine and return to work with out a mask. Triage nurse did review UC note but unable to answer pt specific question with that review. Call pcp protocol followed and pt advised to speak with provider when office opens. Encounter routed to  provider Reason for Disposition   [1] Follow-up call from patient regarding patient's clinical status AND [2] information NON-URGENT    Additional Information   Negative: Doctor (or NP/PA) call to PCP   Negative: Call about patient who is currently hospitalized   Negative: Lab or radiology calling with CRITICAL test results   Negative: [1] Follow-up call from patient regarding patient's clinical status AND [2] information urgent   Negative: [1] Caller requests to speak ONLY to PCP AND [2] URGENT question   Negative: [1] Caller requests to speak to PCP now AND [2] won't tell us reason for call  (Exception: If 10 pm to 6 am, caller must first discuss reason for the call.)   Negative: Notification of hospital admission   Negative: Notification of death    Protocols used: [facility]CP Call - [facility]

## 2023-12-19 ENCOUNTER — OFFICE VISIT (OUTPATIENT)
Dept: URGENT CARE | Facility: CLINIC | Age: 28
End: 2023-12-19
Payer: COMMERCIAL

## 2023-12-19 VITALS
DIASTOLIC BLOOD PRESSURE: 79 MMHG | SYSTOLIC BLOOD PRESSURE: 111 MMHG | WEIGHT: 120 LBS | TEMPERATURE: 98 F | RESPIRATION RATE: 18 BRPM | HEIGHT: 61 IN | OXYGEN SATURATION: 97 % | BODY MASS INDEX: 22.66 KG/M2 | HEART RATE: 78 BPM

## 2023-12-19 DIAGNOSIS — J02.0 PHARYNGITIS DUE TO GROUP A BETA HEMOLYTIC STREPTOCOCCI: Primary | ICD-10-CM

## 2023-12-19 DIAGNOSIS — J02.9 SORE THROAT: ICD-10-CM

## 2023-12-19 LAB
CTP QC/QA: YES
MOLECULAR STREP A: POSITIVE

## 2023-12-19 PROCEDURE — 87651 STREP A DNA AMP PROBE: CPT | Mod: QW,S$GLB,,

## 2023-12-19 PROCEDURE — 87651 POCT STREP A MOLECULAR: ICD-10-PCS | Mod: QW,S$GLB,,

## 2023-12-19 PROCEDURE — 99213 PR OFFICE/OUTPT VISIT, EST, LEVL III, 20-29 MIN: ICD-10-PCS | Mod: S$GLB,,,

## 2023-12-19 PROCEDURE — 99213 OFFICE O/P EST LOW 20 MIN: CPT | Mod: S$GLB,,,

## 2023-12-19 RX ORDER — PENICILLIN V POTASSIUM 500 MG/1
500 TABLET, FILM COATED ORAL 2 TIMES DAILY
Qty: 20 TABLET | Refills: 0 | Status: SHIPPED | OUTPATIENT
Start: 2023-12-19 | End: 2023-12-29

## 2023-12-19 NOTE — PROGRESS NOTES
"Subjective:      Patient ID: Jennifer Velarde is a 28 y.o. female.    Vitals:  height is 5' 1" (1.549 m) and weight is 54.4 kg (120 lb). Her temperature is 98.1 °F (36.7 °C). Her blood pressure is 111/79 and her pulse is 78. Her respiration is 18 and oxygen saturation is 97%.     Chief Complaint: Sore Throat    28 yr old pt presents sore throat. Pt stated symptoms started Sunday/ pt stated she was exposed to strep.    Provider note starts below:  Patient presents to clinic c/o sore throat which onset 3 days ago. Pain started as just a "scratchy" throat but patient woke up this morning and had painful swallowing. States her boyfriend tested positive for strep yesterday. She has taken ibuprofen which has provided some relief. Of note, patient had tonsillitis 1-2 times within the past 3 months but never had positive strep test. She took amoxicillin and Augmentin during those times for which she could not tolerate side effects. Patient denies fever, chills, neck pain, neck stiffness, nausea, vomiting, SOB, cough.    Sore Throat   This is a new problem. The current episode started in the past 7 days. The problem has been gradually improving. There has been no fever. Pertinent negatives include no congestion, coughing, ear pain, headaches, neck pain, shortness of breath, trouble swallowing or vomiting. She has had exposure to strep. She has tried NSAIDs for the symptoms. The treatment provided mild relief.       Constitution: Negative for chills and fever.   HENT:  Positive for sore throat. Negative for ear pain, congestion, postnasal drip, sinus pain, sinus pressure, trouble swallowing and voice change.    Neck: Negative for neck pain and neck stiffness.   Cardiovascular:  Negative for chest pain and palpitations.   Respiratory:  Negative for cough and shortness of breath.    Gastrointestinal:  Negative for nausea and vomiting.   Musculoskeletal:  Negative for muscle cramps and muscle ache.   Skin:  Negative for pale " and rash.   Neurological:  Negative for dizziness and headaches.      Objective:     Physical Exam   Constitutional: She is oriented to person, place, and time.   HENT:   Head: Normocephalic and atraumatic.   Ears:   Right Ear: Tympanic membrane normal.   Left Ear: Tympanic membrane normal.   Nose: Nose normal.   Mouth/Throat: Oropharyngeal exudate and posterior oropharyngeal erythema present.   Eyes: Conjunctivae are normal. Extraocular movement intact   Neck: Neck supple.   Cardiovascular: Normal rate, regular rhythm, normal heart sounds and normal pulses.   Pulmonary/Chest: Effort normal and breath sounds normal.   Abdominal: Normal appearance.   Musculoskeletal: Normal range of motion.         General: Normal range of motion.   Lymphadenopathy:     She has cervical adenopathy.   Neurological: She is alert, oriented to person, place, and time and at baseline.   Skin: Skin is warm, dry and no rash.   Psychiatric: Her behavior is normal. Mood normal.   Nursing note and vitals reviewed.      Assessment:     Results for orders placed or performed in visit on 12/19/23   POCT Strep A, Molecular   Result Value Ref Range    Molecular Strep A, POC Positive (A) Negative     Acceptable Yes        1. Pharyngitis due to group A beta hemolytic Streptococci    2. Sore throat        Plan:     Pharyngitis due to group A beta hemolytic Streptococci  -     penicillin v potassium (VEETID) 500 MG tablet; Take 1 tablet (500 mg total) by mouth 2 (two) times a day. for 10 days  Dispense: 20 tablet; Refill: 0    Sore throat  -     POCT Strep A, Molecular            Patient Instructions   Your strep test today was positive.     Please complete full course of oral antibiotics to prevent strep complications.  Rheumatic fever (a disease that can affect the heart, joints, brain, and skin)  Post-streptococcal glomerulonephritis (a kidney disease)    Alternate ibuprofen and tylenol every 4-6 hrs for fever and/or pain.       Discard old toothbrush after 2 days of oral antibiotics.    Do not let others eat/drink after you.    Fever and sore throat typically resolve within one to three days. Most patients can return to work, school, or  after 24 hours of antibiotic therapy, provided they are afebrile and otherwise well.     To help ease a sore throat, you can:  Use a sore throat spray.  Suck on hard candy or throat lozenges.  Gargle with warm saltwater a few times each day. Mix of 1/4 teaspoon (1.25 grams) salt in 8 ounces (240 mL) of warm water.  Use a cool mist humidifier to help you breathe easier.    Please drink plenty of fluids.  Please get plenty of rest.  If you smoke, please stop smoking.

## 2023-12-19 NOTE — PATIENT INSTRUCTIONS
Your strep test today was positive.     Please complete full course of oral antibiotics to prevent strep complications.  Rheumatic fever (a disease that can affect the heart, joints, brain, and skin)  Post-streptococcal glomerulonephritis (a kidney disease)    Alternate ibuprofen and tylenol every 4-6 hrs for fever and/or pain.      Discard old toothbrush after 2 days of oral antibiotics.    Do not let others eat/drink after you.    Fever and sore throat typically resolve within one to three days. Most patients can return to work, school, or  after 24 hours of antibiotic therapy, provided they are afebrile and otherwise well.     To help ease a sore throat, you can:  Use a sore throat spray.  Suck on hard candy or throat lozenges.  Gargle with warm saltwater a few times each day. Mix of 1/4 teaspoon (1.25 grams) salt in 8 ounces (240 mL) of warm water.  Use a cool mist humidifier to help you breathe easier.    Please drink plenty of fluids.  Please get plenty of rest.  If you smoke, please stop smoking.